# Patient Record
Sex: MALE | Race: WHITE
[De-identification: names, ages, dates, MRNs, and addresses within clinical notes are randomized per-mention and may not be internally consistent; named-entity substitution may affect disease eponyms.]

---

## 2017-03-15 ENCOUNTER — HOSPITAL ENCOUNTER (OUTPATIENT)
Dept: HOSPITAL 79 - LAB | Age: 62
End: 2017-03-15
Attending: CLINIC/CENTER
Payer: MEDICARE

## 2017-03-15 DIAGNOSIS — E78.2: ICD-10-CM

## 2017-03-15 DIAGNOSIS — R10.30: ICD-10-CM

## 2017-03-15 DIAGNOSIS — J20.8: ICD-10-CM

## 2017-03-15 DIAGNOSIS — Z00.00: Primary | ICD-10-CM

## 2017-03-15 DIAGNOSIS — F17.210: ICD-10-CM

## 2017-03-15 DIAGNOSIS — R35.0: ICD-10-CM

## 2017-03-15 DIAGNOSIS — R10.13: ICD-10-CM

## 2017-03-15 DIAGNOSIS — K21.9: ICD-10-CM

## 2017-03-15 DIAGNOSIS — J44.9: ICD-10-CM

## 2017-03-15 DIAGNOSIS — I10: ICD-10-CM

## 2017-03-15 DIAGNOSIS — J43.8: ICD-10-CM

## 2017-03-15 LAB
BUN/CREATININE RATIO: 14 (ref 0–10)
HGB BLD-MCNC: 16.6 GM/DL (ref 14–17.5)
RED BLOOD COUNT: 5.03 M/UL (ref 4.2–5.5)
WHITE BLOOD COUNT: 9.2 K/UL (ref 4.5–11)

## 2017-03-17 ENCOUNTER — HOSPITAL ENCOUNTER (OUTPATIENT)
Dept: HOSPITAL 79 - LAB | Age: 62
End: 2017-03-17
Attending: CLINIC/CENTER
Payer: MEDICARE

## 2017-03-17 DIAGNOSIS — I10: ICD-10-CM

## 2017-03-17 DIAGNOSIS — R10.13: ICD-10-CM

## 2017-03-17 DIAGNOSIS — J44.9: ICD-10-CM

## 2017-03-17 DIAGNOSIS — R35.0: ICD-10-CM

## 2017-03-17 DIAGNOSIS — J20.8: ICD-10-CM

## 2017-03-17 DIAGNOSIS — J43.8: ICD-10-CM

## 2017-03-17 DIAGNOSIS — F17.210: ICD-10-CM

## 2017-03-17 DIAGNOSIS — R10.30: ICD-10-CM

## 2017-03-17 DIAGNOSIS — E78.2: ICD-10-CM

## 2017-03-17 DIAGNOSIS — Z00.00: Primary | ICD-10-CM

## 2017-03-17 DIAGNOSIS — K21.9: ICD-10-CM

## 2017-04-06 ENCOUNTER — HOSPITAL ENCOUNTER (OUTPATIENT)
Dept: HOSPITAL 79 - KOH-I | Age: 62
End: 2017-04-06
Attending: CLINIC/CENTER
Payer: MEDICARE

## 2017-04-06 DIAGNOSIS — M99.71: ICD-10-CM

## 2017-04-06 DIAGNOSIS — M47.816: ICD-10-CM

## 2017-04-06 DIAGNOSIS — M50.31: ICD-10-CM

## 2017-04-06 DIAGNOSIS — M54.2: Primary | ICD-10-CM

## 2017-04-06 DIAGNOSIS — M25.78: ICD-10-CM

## 2017-04-06 DIAGNOSIS — M51.36: ICD-10-CM

## 2021-10-20 ENCOUNTER — HOSPITAL ENCOUNTER (OUTPATIENT)
Dept: GENERAL RADIOLOGY | Facility: HOSPITAL | Age: 66
Discharge: HOME | End: 2021-10-20
Admitting: FAMILY MEDICINE

## 2021-10-20 ENCOUNTER — TRANSCRIBE ORDERS (OUTPATIENT)
Dept: ADMINISTRATIVE | Facility: HOSPITAL | Age: 66
End: 2021-10-20

## 2021-10-20 DIAGNOSIS — R06.02 SHORTNESS OF BREATH: Primary | ICD-10-CM

## 2021-10-20 DIAGNOSIS — R06.02 SHORTNESS OF BREATH: ICD-10-CM

## 2021-10-20 PROCEDURE — 71046 X-RAY EXAM CHEST 2 VIEWS: CPT

## 2021-10-20 PROCEDURE — 71046 X-RAY EXAM CHEST 2 VIEWS: CPT | Performed by: RADIOLOGY

## 2021-11-17 ENCOUNTER — HOSPITAL ENCOUNTER (OUTPATIENT)
Dept: GENERAL RADIOLOGY | Facility: HOSPITAL | Age: 66
Discharge: HOME | End: 2021-11-17
Admitting: FAMILY MEDICINE

## 2021-11-17 ENCOUNTER — TRANSCRIBE ORDERS (OUTPATIENT)
Dept: ADMINISTRATIVE | Facility: HOSPITAL | Age: 66
End: 2021-11-17

## 2021-11-17 DIAGNOSIS — M54.50 LOW BACK PAIN, UNSPECIFIED BACK PAIN LATERALITY, UNSPECIFIED CHRONICITY, UNSPECIFIED WHETHER SCIATICA PRESENT: ICD-10-CM

## 2021-11-17 DIAGNOSIS — M54.50 LOW BACK PAIN, UNSPECIFIED BACK PAIN LATERALITY, UNSPECIFIED CHRONICITY, UNSPECIFIED WHETHER SCIATICA PRESENT: Primary | ICD-10-CM

## 2021-11-17 PROCEDURE — 72040 X-RAY EXAM NECK SPINE 2-3 VW: CPT | Performed by: RADIOLOGY

## 2021-11-17 PROCEDURE — 72072 X-RAY EXAM THORAC SPINE 3VWS: CPT

## 2021-11-17 PROCEDURE — 72040 X-RAY EXAM NECK SPINE 2-3 VW: CPT

## 2021-11-17 PROCEDURE — 72072 X-RAY EXAM THORAC SPINE 3VWS: CPT | Performed by: RADIOLOGY

## 2021-11-17 PROCEDURE — 72100 X-RAY EXAM L-S SPINE 2/3 VWS: CPT | Performed by: RADIOLOGY

## 2021-11-17 PROCEDURE — 72100 X-RAY EXAM L-S SPINE 2/3 VWS: CPT

## 2021-11-22 ENCOUNTER — APPOINTMENT (OUTPATIENT)
Dept: GENERAL RADIOLOGY | Facility: HOSPITAL | Age: 66
End: 2021-11-22

## 2022-01-01 ENCOUNTER — APPOINTMENT (OUTPATIENT)
Dept: CT IMAGING | Facility: HOSPITAL | Age: 67
End: 2022-01-01

## 2022-01-01 ENCOUNTER — OFFICE VISIT (OUTPATIENT)
Dept: NEUROSURGERY | Facility: CLINIC | Age: 67
End: 2022-01-01

## 2022-01-01 ENCOUNTER — HOSPITAL ENCOUNTER (EMERGENCY)
Facility: HOSPITAL | Age: 67
Discharge: HOME | End: 2022-10-07
Attending: EMERGENCY MEDICINE | Admitting: EMERGENCY MEDICINE

## 2022-01-01 VITALS
HEIGHT: 68 IN | BODY MASS INDEX: 27.19 KG/M2 | SYSTOLIC BLOOD PRESSURE: 142 MMHG | DIASTOLIC BLOOD PRESSURE: 92 MMHG | WEIGHT: 179.4 LBS | RESPIRATION RATE: 20 BRPM

## 2022-01-01 VITALS
SYSTOLIC BLOOD PRESSURE: 110 MMHG | BODY MASS INDEX: 29.25 KG/M2 | DIASTOLIC BLOOD PRESSURE: 84 MMHG | RESPIRATION RATE: 20 BRPM | HEART RATE: 80 BPM | TEMPERATURE: 97.7 F | OXYGEN SATURATION: 93 % | HEIGHT: 68 IN | WEIGHT: 193 LBS

## 2022-01-01 DIAGNOSIS — Z95.1 HISTORY OF CORONARY ARTERY BYPASS GRAFT: ICD-10-CM

## 2022-01-01 DIAGNOSIS — Z79.01 CHRONIC ANTICOAGULATION: ICD-10-CM

## 2022-01-01 DIAGNOSIS — M54.2 CHRONIC NECK PAIN: Primary | ICD-10-CM

## 2022-01-01 DIAGNOSIS — G89.29 CHRONIC NECK PAIN: Primary | ICD-10-CM

## 2022-01-01 DIAGNOSIS — M62.838 NECK MUSCLE SPASM: Primary | ICD-10-CM

## 2022-01-01 PROCEDURE — 99283 EMERGENCY DEPT VISIT LOW MDM: CPT

## 2022-01-01 PROCEDURE — 96372 THER/PROPH/DIAG INJ SC/IM: CPT

## 2022-01-01 PROCEDURE — 25010000002 ORPHENADRINE CITRATE PER 60 MG: Performed by: PHYSICIAN ASSISTANT

## 2022-01-01 PROCEDURE — 72125 CT NECK SPINE W/O DYE: CPT

## 2022-01-01 PROCEDURE — 99204 OFFICE O/P NEW MOD 45 MIN: CPT | Performed by: NEUROLOGICAL SURGERY

## 2022-01-01 PROCEDURE — 72128 CT CHEST SPINE W/O DYE: CPT

## 2022-01-01 RX ORDER — DICLOFENAC SODIUM 75 MG/1
75 TABLET, DELAYED RELEASE ORAL 2 TIMES DAILY
Qty: 30 TABLET | Refills: 0 | Status: SHIPPED | OUTPATIENT
Start: 2022-01-01

## 2022-01-01 RX ORDER — HYDROCODONE BITARTRATE AND ACETAMINOPHEN 7.5; 325 MG/1; MG/1
1 TABLET ORAL ONCE
Status: COMPLETED | OUTPATIENT
Start: 2022-01-01 | End: 2022-01-01

## 2022-01-01 RX ORDER — ORPHENADRINE CITRATE 30 MG/ML
60 INJECTION INTRAMUSCULAR; INTRAVENOUS ONCE
Status: COMPLETED | OUTPATIENT
Start: 2022-01-01 | End: 2022-01-01

## 2022-01-01 RX ORDER — LIDOCAINE 50 MG/G
1 PATCH TOPICAL EVERY 24 HOURS
Qty: 15 EACH | Refills: 0 | Status: SHIPPED | OUTPATIENT
Start: 2022-01-01

## 2022-01-01 RX ORDER — METHOCARBAMOL 750 MG/1
750 TABLET, FILM COATED ORAL 3 TIMES DAILY PRN
Qty: 30 TABLET | Refills: 0 | Status: SHIPPED | OUTPATIENT
Start: 2022-01-01

## 2022-01-01 RX ORDER — LIDOCAINE 50 MG/G
1 PATCH TOPICAL ONCE
Status: DISCONTINUED | OUTPATIENT
Start: 2022-01-01 | End: 2022-01-01 | Stop reason: HOSPADM

## 2022-01-01 RX ADMIN — ORPHENADRINE CITRATE 60 MG: 30 INJECTION INTRAMUSCULAR; INTRAVENOUS at 04:39

## 2022-01-01 RX ADMIN — LIDOCAINE 1 PATCH: 50 PATCH CUTANEOUS at 04:33

## 2022-01-01 RX ADMIN — HYDROCODONE BITARTRATE AND ACETAMINOPHEN 1 TABLET: 7.5; 325 TABLET ORAL at 03:24

## 2022-10-07 NOTE — ED PROVIDER NOTES
Subjective     History provided by:  Patient  Neck Pain  Pain location:  Occipital region  Quality:  Aching  Pain radiates to:  L arm and R arm  Pain severity:  Moderate  Onset quality:  Sudden  Duration:  3 hours  Timing:  Constant  Progression:  Worsening  Chronicity:  New  Relieved by:  Nothing  Associated symptoms: no chest pain and no fever        Review of Systems   Constitutional: Negative.  Negative for fever.   HENT: Negative.    Respiratory: Negative.    Cardiovascular: Negative.  Negative for chest pain.   Gastrointestinal: Negative.  Negative for abdominal pain.   Endocrine: Negative.    Genitourinary: Negative.  Negative for dysuria.   Musculoskeletal: Positive for neck pain and neck stiffness.   Skin: Negative.    Neurological: Negative.    Psychiatric/Behavioral: Negative.    All other systems reviewed and are negative.      No past medical history on file.    No Known Allergies    No past surgical history on file.    No family history on file.    Social History     Socioeconomic History   • Marital status:            Objective   Physical Exam  Vitals and nursing note reviewed.   Constitutional:       General: He is not in acute distress.     Appearance: He is well-developed. He is not diaphoretic.   HENT:      Head: Normocephalic and atraumatic.      Right Ear: External ear normal.      Left Ear: External ear normal.      Nose: Nose normal.   Eyes:      Conjunctiva/sclera: Conjunctivae normal.   Neck:      Vascular: No JVD.      Trachea: No tracheal deviation.      Comments: Occipital cervical tenderness  Cardiovascular:      Rate and Rhythm: Normal rate.      Heart sounds: No murmur heard.  Pulmonary:      Effort: Pulmonary effort is normal. No respiratory distress.      Breath sounds: No wheezing.   Abdominal:      Palpations: Abdomen is soft.      Tenderness: There is no abdominal tenderness.   Musculoskeletal:         General: No deformity. Normal range of motion.      Cervical back:  Normal range of motion and neck supple. Tenderness present.   Skin:     General: Skin is warm and dry.      Coloration: Skin is not pale.      Findings: No erythema or rash.   Neurological:      Mental Status: He is alert and oriented to person, place, and time.      Cranial Nerves: No cranial nerve deficit.   Psychiatric:         Behavior: Behavior normal.         Thought Content: Thought content normal.         Procedures           ED Course  ED Course as of 10/07/22 0433   Fri Oct 07, 2022   0348 CT thoracic spine rad interpreted: Mild degenerative disease. No fracture or malalignment. No evidence of central canal or foraminal stenosis. [RB]   0416 CT cervical spine rad interpreted:  1. Technically degraded exam as above. However, no acute fracture is seen.  2. Reversal of lordosis may indicate spasm.  3. Multilevel degenerative disc disease and facet arthropathy as described with mild C4-5 spondylolisthesis. [RB]      ED Course User Index  [RB] Abilio Wang II, PA                                           MDM  Number of Diagnoses or Management Options  Neck muscle spasm: new and requires workup     Amount and/or Complexity of Data Reviewed  Tests in the radiology section of CPT®: ordered and reviewed    Risk of Complications, Morbidity, and/or Mortality  Presenting problems: low  Diagnostic procedures: low  Management options: low    Patient Progress  Patient progress: stable      Final diagnoses:   Neck muscle spasm       ED Disposition  ED Disposition     ED Disposition   Discharge    Condition   Stable    Comment   --             Bailey Jon, APRKAITLYNN  [address] [facility] [address]  [address] [address]  [phone]    Schedule an appointment as soon as possible for a visit            Medication List      New Prescriptions    diclofenac 75 MG EC tablet  Commonly known as: VOLTAREN  Take 1 tablet by mouth 2 (Two) Times a Day.     lidocaine 5 %  Commonly known as: LIDODERM  Place 1 patch on the skin as directed by  provider Daily. Remove & Discard patch within 12 hours or as directed by MD     methocarbamol 750 MG tablet  Commonly known as: ROBAXIN  Take 1 tablet by mouth 3 (Three) Times a Day As Needed for Muscle Spasms (pain).           Where to Get Your Medications      [facility] [place] [address]    Hours: 8AM-6PM Mon-Fri Phone: [phone]   · diclofenac 75 MG EC tablet  · lidocaine 5 %  · methocarbamol 750 MG tablet          Abilio Wang II, PA  10/07/22 0433

## 2022-10-07 NOTE — ED NOTES
PT STAYING FOR 15 MIN AFTER IM INJECTION TO WATCH FOR S/S OF REACTION. PT SON AT BEDSIDE AT THIS TIME

## 2022-11-02 PROBLEM — Z95.1 HISTORY OF CORONARY ARTERY BYPASS GRAFT: Status: ACTIVE | Noted: 2022-01-01

## 2022-11-02 PROBLEM — Z79.01 CHRONIC ANTICOAGULATION: Status: ACTIVE | Noted: 2022-01-01

## 2022-11-02 NOTE — PROGRESS NOTES
"Subjective     Chief Complaint: Neck pain    Patient ID: Chandra Luis is a 67 y.o. male seen for consultation today at the request of  ANGIE Nava    History of Present Illness    This is a 67-year-old man who presents to my office with chief complaints of intermittent, longstanding neck pain, which about 3 weeks ago got significantly worse.  This was enough to bring him to the emergency department.  He has received 2 Medrol Dosepaks and intramuscular Toradol, none of which has provided any meaningful/lasting relief.    He has numerous medical comorbidities including chronic anticoagulation, history of coronary artery bypass graft, and a pacemaker.    The following portions of the patient's history were reviewed and updated as appropriate: allergies, current medications, past family history, past medical history, past social history, past surgical history and problem list.    Family history: History reviewed. No pertinent family history.    Social history:   Social History     Socioeconomic History   • Marital status:    Tobacco Use   • Smoking status: Former     Packs/day: 1.50     Years: 30.00     Pack years: 45.00     Types: Cigarettes     Start date: 1970     Quit date: 2018     Years since quittin.1   Substance and Sexual Activity   • Alcohol use: Not Currently     Alcohol/week: 10.0 standard drinks     Types: 10 Cans of beer per week   • Drug use: Not Currently     Frequency: 5.0 times per week     Types: Hydrocodone, Oxycodone     Comment: Took more than prescribed. Family is taking care of meds now   • Sexual activity: Not Currently     Partners: Female     Birth control/protection: Condom, None       Review of Systems   Musculoskeletal: Positive for neck pain.       Objective   Blood pressure 142/92, resp. rate 20, height 172.7 cm (68\"), weight 81.4 kg (179 lb 6.4 oz).  Body mass index is 27.28 kg/m².    Physical Exam  Vitals reviewed.   Constitutional:       General: He is " not in acute distress.     Appearance: He is well-developed. He is not diaphoretic.   HENT:      Head: Normocephalic and atraumatic.   Neck:      Comments: Spurling sign is absent.  Lhermitte sign is absent.  Severely restricted range of motion of the cervical spine in flexion/extension, lateral bending, and rotation bilaterally.  Pulmonary:      Effort: Pulmonary effort is normal.   Musculoskeletal:      Cervical back: Pain with movement present. Decreased range of motion.   Skin:     General: Skin is warm and dry.   Neurological:      Mental Status: He is alert and oriented to person, place, and time.   Psychiatric:         Behavior: Behavior normal.         Assessment & Plan     Independent Review of Radiographic Studies:      Available for my review is a CT scan of the neck which was performed on 10/7/2022.  This demonstrates severe degenerative disc disease at C5-6 and C6-7.  The patient appears to have likely arthrodesed C7-T1, C6-7, and C5-6.  There is cervical kyphosis which is centered around the C4-5 disc space.    Medical Decision Making:      The patient is not complaining of any signs or symptoms of cervical radiculopathy or cervical myelopathy.  He is a good candidate for physical therapy and pain management.  There is no role for surgery in the management of his chronic neck pain.  I reviewed the signs and symptoms of cervical myelopathy and cervical radiculopathy with him.  I referred him for pain management and physical therapy.  I would be happy to follow-up with him on an as-needed basis moving forward.  If he develops signs or symptoms of cervical radiculopathy or cervical myelopathy, then he would need a cervical myelogram as it sounds like he is probably pacer dependent due to his pacemaker.    Diagnoses and all orders for this visit:    1. Chronic neck pain (Primary)  -     Ambulatory Referral to Pain Management    2. History of coronary artery bypass graft    3. Chronic  anticoagulation        No follow-ups on file.           This document signed by PABLO Cruz MD November 2, 2022 14:26 EDT